# Patient Record
Sex: MALE | Race: WHITE | NOT HISPANIC OR LATINO
[De-identification: names, ages, dates, MRNs, and addresses within clinical notes are randomized per-mention and may not be internally consistent; named-entity substitution may affect disease eponyms.]

---

## 2021-04-20 PROBLEM — Z00.00 ENCOUNTER FOR PREVENTIVE HEALTH EXAMINATION: Status: ACTIVE | Noted: 2021-04-20

## 2021-04-21 ENCOUNTER — APPOINTMENT (OUTPATIENT)
Dept: ORTHOPEDIC SURGERY | Facility: CLINIC | Age: 71
End: 2021-04-21
Payer: OTHER MISCELLANEOUS

## 2021-04-21 DIAGNOSIS — M47.817 SPONDYLOSIS W/OUT MYELOPATHY OR RADICULOPATHY, LUMBOSACRAL REGION: ICD-10-CM

## 2021-04-21 DIAGNOSIS — M54.5 LOW BACK PAIN: ICD-10-CM

## 2021-04-21 PROCEDURE — 99072 ADDL SUPL MATRL&STAF TM PHE: CPT

## 2021-04-21 PROCEDURE — 99203 OFFICE O/P NEW LOW 30 MIN: CPT

## 2021-04-21 NOTE — HISTORY OF PRESENT ILLNESS
[de-identified] : Mr. RAUL YOUNG  is a 70 year old male who presents with right lumbar pain since 3/30/21 when he lifted something at work.  He does feel better.  Denies any LE radicular symptoms.  Normal bowel and bladder control.   Denies any recent fevers, chills, sweats, weight loss, or infection.\par \par The patients past medical history, past surgical history, medications, allergies, and social history were reviewed by me today with the patient and documented accordingly.  In addition, the patient's family history, which is noncontributory to their visit, was also reviewed.\par

## 2021-04-21 NOTE — DISCUSSION/SUMMARY
[de-identified] : Overall he is much improved.  He does not wish any additional treatment.  He will let me know of any changes or worsening of his symptoms.

## 2021-04-21 NOTE — PHYSICAL EXAM
[Antalgic] : not antalgic [de-identified] : Examination of the lumbar spine reveals no midline tenderness palpation, step-offs, or skin lesions. Decreased range of motion with respect to flexion, extension, lateral bending, and rotation. No tenderness to palpation of the sciatic notch. No tenderness palpation of the bilateral greater trochanters. No pain with passive internal/external rotation of the hips. No instability of bilateral lower extremities.  Negative DARRYN. Negative straight leg raise bilaterally. No bowstring. Negative femoral stretch. 5 out of 5 iliopsoas, hip abductors, hips adductors, quadriceps, hamstrings, gastrocsoleus, tibialis anterior, extensor hallucis longus, peroneals. Grossly intact sensation to light touch bilateral lower extremities. 1+ patellar and Achilles reflexes. Downgoing Babinski. No clonus. Intact proprioception. Palpable pulses. No skin lesion and no edema on the right and left lower extremities. [de-identified] : AP lateral lumbar x-rays reveals mild spondylosis without instability or aggressive lesions\par

## 2022-02-14 ENCOUNTER — APPOINTMENT (OUTPATIENT)
Dept: GASTROENTEROLOGY | Facility: CLINIC | Age: 72
End: 2022-02-14
Payer: COMMERCIAL

## 2022-02-14 VITALS — BODY MASS INDEX: 34.36 KG/M2 | WEIGHT: 232 LBS | HEIGHT: 69 IN

## 2022-02-14 DIAGNOSIS — Z12.11 ENCOUNTER FOR SCREENING FOR MALIGNANT NEOPLASM OF COLON: ICD-10-CM

## 2022-02-14 DIAGNOSIS — Z86.79 PERSONAL HISTORY OF OTHER DISEASES OF THE CIRCULATORY SYSTEM: ICD-10-CM

## 2022-02-14 DIAGNOSIS — Z78.9 OTHER SPECIFIED HEALTH STATUS: ICD-10-CM

## 2022-02-14 PROCEDURE — 99204 OFFICE O/P NEW MOD 45 MIN: CPT

## 2022-02-14 RX ORDER — SODIUM SULFATE, POTASSIUM SULFATE, MAGNESIUM SULFATE 17.5; 3.13; 1.6 G/ML; G/ML; G/ML
17.5-3.13-1.6 SOLUTION, CONCENTRATE ORAL TWICE DAILY
Qty: 2 | Refills: 0 | Status: ACTIVE | COMMUNITY
Start: 2022-02-14 | End: 1900-01-01

## 2022-02-14 NOTE — ASSESSMENT
[FreeTextEntry1] : RAUL YOUNG was advised to undergo colonoscopy to which he agreed. The procedure will be performed in [facility] \par [facility] with the assistance of an anesthesiologist. The patient was given a Suprep preparation prescription and understood the \par procedure as it was explained to his. He was given a booklet distributed by the [employer]\par  [employer] explaining the procedure in detail and he understood the risks of the procedure not limited to infection, bleeding,\par perforation or non- diagnosis of colorectal cancer. He was advised that he could not drive home, if he chooses to\par  receive sedation.\par \par Further diagnostic and treatment recommendations will be based upon the procedure and any biopsies, if they are taken.\par \par Thank you for allowing me to participate in this [facility] care.\par \par , Best personal regards -- Don\par

## 2022-02-14 NOTE — HISTORY OF PRESENT ILLNESS
[FreeTextEntry1] : He is a 71-year-old asymptomatic male referred for screening colonoscopy.  His last colonoscopy was approximately 10 years ago.  He denies a family history of colon cancer

## 2022-03-16 ENCOUNTER — RESULT REVIEW (OUTPATIENT)
Age: 72
End: 2022-03-16

## 2022-03-17 ENCOUNTER — APPOINTMENT (OUTPATIENT)
Dept: GASTROENTEROLOGY | Facility: AMBULATORY SURGERY CENTER | Age: 72
End: 2022-03-17
Payer: COMMERCIAL

## 2022-03-17 PROCEDURE — 45385 COLONOSCOPY W/LESION REMOVAL: CPT | Mod: 33
